# Patient Record
Sex: MALE | Race: WHITE | NOT HISPANIC OR LATINO | Employment: UNEMPLOYED | URBAN - METROPOLITAN AREA
[De-identification: names, ages, dates, MRNs, and addresses within clinical notes are randomized per-mention and may not be internally consistent; named-entity substitution may affect disease eponyms.]

---

## 2017-01-10 PROBLEM — H26.8 MATURE CATARACT: Status: ACTIVE | Noted: 2017-01-10

## 2017-01-18 PROBLEM — Z96.1 PSEUDOPHAKIA OF LEFT EYE: Status: ACTIVE | Noted: 2017-01-18

## 2017-12-15 PROBLEM — R97.20 ELEVATED PSA: Status: ACTIVE | Noted: 2017-12-15

## 2019-04-02 ENCOUNTER — PATIENT OUTREACH (OUTPATIENT)
Dept: ADMINISTRATIVE | Facility: HOSPITAL | Age: 69
End: 2019-04-02

## 2021-12-22 ENCOUNTER — PATIENT OUTREACH (OUTPATIENT)
Dept: ADMINISTRATIVE | Facility: HOSPITAL | Age: 71
End: 2021-12-22

## 2022-05-04 DIAGNOSIS — Z12.11 COLON CANCER SCREENING: ICD-10-CM

## 2022-07-11 ENCOUNTER — PATIENT MESSAGE (OUTPATIENT)
Dept: ADMINISTRATIVE | Facility: HOSPITAL | Age: 72
End: 2022-07-11

## 2023-01-30 ENCOUNTER — HOSPITAL ENCOUNTER (EMERGENCY)
Facility: HOSPITAL | Age: 73
Discharge: HOME | End: 2023-01-31
Attending: EMERGENCY MEDICINE
Payer: MEDICARE

## 2023-01-30 VITALS
SYSTOLIC BLOOD PRESSURE: 141 MMHG | DIASTOLIC BLOOD PRESSURE: 85 MMHG | WEIGHT: 185.81 LBS | HEART RATE: 98 BPM | BODY MASS INDEX: 26.6 KG/M2 | OXYGEN SATURATION: 97 % | TEMPERATURE: 98 F | HEIGHT: 70 IN | RESPIRATION RATE: 18 BRPM

## 2023-01-30 DIAGNOSIS — R04.0 EPISTAXIS: Primary | ICD-10-CM

## 2023-01-30 PROCEDURE — 99283 EMERGENCY DEPT VISIT LOW MDM: CPT

## 2023-01-30 PROCEDURE — 25000003 PHARM REV CODE 250: Performed by: EMERGENCY MEDICINE

## 2023-01-30 RX ORDER — ONDANSETRON 4 MG/1
4-8 TABLET, ORALLY DISINTEGRATING ORAL EVERY 8 HOURS PRN
Qty: 20 TABLET | Refills: 0 | Status: SHIPPED | OUTPATIENT
Start: 2023-01-30 | End: 2023-02-24

## 2023-01-30 RX ORDER — ONDANSETRON 4 MG/1
8 TABLET, ORALLY DISINTEGRATING ORAL
Status: COMPLETED | OUTPATIENT
Start: 2023-01-30 | End: 2023-01-30

## 2023-01-30 RX ADMIN — ONDANSETRON 8 MG: 4 TABLET, ORALLY DISINTEGRATING ORAL at 11:01

## 2023-01-30 RX ADMIN — PHENYLEPHRINE HYDROCHLORIDE 2 SPRAY: 0.5 SPRAY NASAL at 11:01

## 2023-01-31 NOTE — DISCHARGE INSTRUCTIONS
If your nose starts to bleed again, do what we discussed; blow your nose, 2 sprays Afrin each nostril, strong constant pressure to right under the bridge of the nose for 20 minutes straight.  Read all discharge instructions/education provided: follow all recommendations and return precautions.  Take all medications as prescribed.  Follow up with your primary care doctor and/or specialist as directed.  Return to emergency department immediately for any new or worsening or recurrent symptoms.

## 2023-01-31 NOTE — ED PROVIDER NOTES
"Encounter Date: 1/30/2023       History     Chief Complaint   Patient presents with    Epistaxis     Patient reports his "blew his nose at 6:30 am" and his nose has been bleeding since. Santy baby ASA     72-year-old male comes in complaining his nose bleeding since 6:30 a.m..  He takes a baby aspirin.  No other complaints.    Review of patient's allergies indicates:  No Known Allergies  Past Medical History:   Diagnosis Date    Cataract     Hypertension     Hyperthyroidism      Past Surgical History:   Procedure Laterality Date    CATARACT EXTRACTION Left     VASECTOMY       Family History   Problem Relation Age of Onset    No Known Problems Mother     No Known Problems Father      Social History     Tobacco Use    Smoking status: Never    Smokeless tobacco: Never   Substance Use Topics    Alcohol use: Yes     Alcohol/week: 12.0 standard drinks     Types: 12 Cans of beer per week     Comment: 8-9 12ouce beers daily    Drug use: No     Review of Systems   Constitutional:  Negative for fever.   HENT:  Positive for nosebleeds. Negative for sore throat.    Respiratory:  Negative for shortness of breath.    Cardiovascular:  Negative for chest pain.   Gastrointestinal:  Negative for nausea.   Genitourinary:  Negative for dysuria.   Musculoskeletal:  Negative for back pain.   Skin:  Negative for rash.   Neurological:  Negative for weakness.   Hematological:  Does not bruise/bleed easily.   All other systems reviewed and are negative.    Physical Exam     Initial Vitals [01/30/23 2221]   BP Pulse Resp Temp SpO2   (!) 143/87 (!) 122 18 97.9 °F (36.6 °C) 96 %      MAP       --         Physical Exam    Nursing note and vitals reviewed.  Constitutional: He appears well-developed and well-nourished. He is not diaphoretic.   HENT:   Head: Normocephalic and atraumatic.   nosebleed   Eyes: EOM are normal. Pupils are equal, round, and reactive to light.   Neck: Neck supple.   Normal range of motion.  Cardiovascular:  Normal rate " and regular rhythm.           Pulmonary/Chest: Breath sounds normal. No respiratory distress. He has no wheezes.   Abdominal: Abdomen is soft. Bowel sounds are normal. There is no abdominal tenderness.   Musculoskeletal:         General: No tenderness or edema. Normal range of motion.      Cervical back: Normal range of motion and neck supple.     Neurological: He is alert and oriented to person, place, and time. He has normal strength. No cranial nerve deficit or sensory deficit.   Skin: Skin is warm and dry. Capillary refill takes less than 2 seconds.   Psychiatric: He has a normal mood and affect. Thought content normal.       ED Course   Procedures  Labs Reviewed - No data to display       Imaging Results    None          Medications   phenylephrine HCL 0.5% nasal spray 2 spray (2 sprays Each Nostril Given 1/30/23 9397)   ondansetron disintegrating tablet 8 mg (8 mg Oral Given 1/30/23 7327)                Attending Attestation:             Attending ED Notes:   72-year-old male comes in complaining his nose bleeding since 6:30 a.m..  He takes a baby aspirin.  No other complaints.    Afrin given.  Patient held pressure.  Surgicel placed in nostril.  Bleeding controlled. Patient is non-toxic appearing, in no acute distress, and vital signs are stable and normal upon discharge. Upon completion of ED evaluation and management, with consideration of thorough differential diagnosis, the patient was found to have no acutely abnormal physical exam findings or other pathology requiring further emergent intervention or admission at this time. Patient/caregiver has no complaints upon discharge and verbalizes understanding and agreement with diagnosis and treatment plan. Discharge instructions with return precautions provided. Patient/caregiver verbalizes understanding to return to ED immediately for any new or worsening symptoms and to follow up with PCP/specialist recommended in 1-2 days.                          Clinical  Impression:   Final diagnoses:  [R04.0] Epistaxis (Primary)        ED Disposition Condition    Discharge Stable          ED Prescriptions       Medication Sig Dispense Start Date End Date Auth. Provider    ondansetron (ZOFRAN-ODT) 4 MG TbDL Take 1-2 tablets (4-8 mg total) by mouth every 8 (eight) hours as needed (Nausea/Vomiting). 20 tablet 1/30/2023 -- Sana Lewis MD          Follow-up Information       Follow up With Specialties Details Why Contact Info Additional Information    Angelic De Paz NP Family Medicine Schedule an appointment as soon as possible for a visit   [address] [facility] [address]  [phone]       Genaro Wood III, MD Otolaryngology Schedule an appointment as soon as possible for a visit  As needed, If symptoms worsen (ENT) [facility], [facility] [address] [phone]       [facility] Emergency Department Emergency Medicine Go to  As needed, If symptoms worsen [address] [place] [id number] [phone] Floor [address]             Sana Lewis MD  01/31/23 0537

## 2023-01-31 NOTE — ED NOTES
Dr. Lewis instructed pt to sit up straight, afrin given in each nostril, pt instructed to hold firm pressure at the bridge of the nose for 20 minutes to stop nose bleed.